# Patient Record
Sex: MALE | Race: WHITE | Employment: OTHER | URBAN - METROPOLITAN AREA
[De-identification: names, ages, dates, MRNs, and addresses within clinical notes are randomized per-mention and may not be internally consistent; named-entity substitution may affect disease eponyms.]

---

## 2024-10-07 ENCOUNTER — TELEPHONE (OUTPATIENT)
Dept: ORTHOPEDICS CLINIC | Facility: CLINIC | Age: 79
End: 2024-10-07

## 2024-10-07 ENCOUNTER — APPOINTMENT (OUTPATIENT)
Dept: GENERAL RADIOLOGY | Age: 79
End: 2024-10-07
Attending: EMERGENCY MEDICINE
Payer: MEDICARE

## 2024-10-07 ENCOUNTER — HOSPITAL ENCOUNTER (OUTPATIENT)
Age: 79
Discharge: HOME | End: 2024-10-07
Attending: EMERGENCY MEDICINE
Payer: MEDICARE

## 2024-10-07 VITALS
TEMPERATURE: 98 F | OXYGEN SATURATION: 100 % | SYSTOLIC BLOOD PRESSURE: 173 MMHG | DIASTOLIC BLOOD PRESSURE: 71 MMHG | RESPIRATION RATE: 18 BRPM | BODY MASS INDEX: 25.76 KG/M2 | HEIGHT: 68 IN | HEART RATE: 65 BPM | WEIGHT: 170 LBS

## 2024-10-07 DIAGNOSIS — M25.561 ACUTE PAIN OF RIGHT KNEE: Primary | ICD-10-CM

## 2024-10-07 PROCEDURE — 73562 X-RAY EXAM OF KNEE 3: CPT | Performed by: EMERGENCY MEDICINE

## 2024-10-07 PROCEDURE — 99203 OFFICE O/P NEW LOW 30 MIN: CPT

## 2024-10-07 RX ORDER — METHYLPREDNISOLONE 4 MG
TABLET, DOSE PACK ORAL
Qty: 1 EACH | Refills: 0 | Status: SHIPPED | OUTPATIENT
Start: 2024-10-07

## 2024-10-07 RX ORDER — TRAZODONE HYDROCHLORIDE 50 MG/1
1 TABLET, FILM COATED ORAL NIGHTLY PRN
COMMUNITY
Start: 2023-06-18

## 2024-10-07 RX ORDER — OLMESARTAN MEDOXOMIL 40 MG/1
40 TABLET ORAL DAILY
COMMUNITY
Start: 2024-09-30 | End: 2025-09-30

## 2024-10-07 NOTE — ED PROVIDER NOTES
Patient Seen in: [facility]      History     Chief Complaint   Patient presents with    Leg or Foot Injury     Stated Complaint: knee pain    Subjective:   HPI  78-year-old male presents to the immediate care for complaints of right knee pain.  He has noticed that for the last several weeks has had intermittent pain to his right knee.  Today noticed that it swelled up.  He denies any trauma or fall.  Denies any distal numbness or tingling.  Denies any calf pain.  Patient has a history of chronic kidney disease.  Has a history of hypertension.    Objective:     No pertinent past medical history.            No pertinent past surgical history.              No pertinent social history.            Review of Systems    Positive for stated complaint: knee pain  Other systems are as noted in HPI.  Constitutional and vital signs reviewed.      All other systems reviewed and negative except as noted above.    Physical Exam     ED Triage Vitals [10/07/24 1102]   BP (!) 173/71   Pulse 65   Resp 18   Temp 97.9 °F (36.6 °C)   Temp src Temporal   SpO2 100 %   O2 Device None (Room air)       Current Vitals:   Vital Signs  BP: (!) 173/71  Pulse: 65  Resp: 18  Temp: 97.9 °F (36.6 °C)  Temp src: Temporal    Oxygen Therapy  SpO2: 100 %  O2 Device: None (Room air)        Physical Exam  General: Alert and oriented. No acute distress.  HEENT: Normocephalic. No evidence of trauma. Extraocular movements are intact.  Cardiovascular exam: Regular rate and rhythm  Lungs: Clear to auscultation bilaterally.  Right knee: No obvious swelling noted.  He has good range of motion to the right knee without significant pain.  Extremities: No evidence of deformity. No clubbing or cyanosis.  Neuro: No focal deficit is noted.    ED Course   Labs Reviewed - No data to display  Differential includes inflammatory arthritis versus degenerative joint disease.  No evidence to suggest an acute infectious etiology.  Clinically not consistent  with a DVT.    Right knee series was ordered for further evaluation    Right knee series is negative for acute fracture.  There is no significant degenerative joint disease.  However he has noted to has a ossified loose foreign body posterior to the knee seen best on the lateral view.    Patient will be discharged home and given follow-up with orthopedics.     MDM   Patient was screened and evaluated during this visit.   As a treating physician attending to the patient, I determined, within reasonable clinical confidence and prior to discharge, that an emergency medical condition was not or was no longer present.  There was no indication for further evaluation, treatment or admission on an emergency basis.  Comprehensive verbal and written discharge and follow-up instructions were provided to help prevent relapse or worsening.  Patient was instructed to follow-up with her primary care provider for further evaluation and treatment, but to return immediately to the ER for worsening, concerning, new, changing or persisting symptoms.  I discussed the case with the patient and they had no questions, complaints, or concerns.  Patient felt comfortable going home.    ^^Please note that this report has been produced using speech recognition software and may contain errors related to that system including, but not limited to, errors in grammar, punctuation, and spelling, as well as words and phrases that possibly may have been recognized inappropriately.  If there are any questions or concerns, contact the dictating provider for clarification      Medical Decision Making      Disposition and Plan     Clinical Impression:  1. Acute pain of right knee         Disposition:  Discharge  10/7/2024 11:56 am    Follow-up:  Scooter Loja PA  [address] [place] [id number]  [phone]    Schedule an appointment as soon as possible for a visit             Medications Prescribed:  Current Discharge Medication List         START taking these medications    Details   methylPREDNISolone (MEDROL) 4 MG Oral Tablet Therapy Pack Dosepack: take as directed  Qty: 1 each, Refills: 0                 Supplementary Documentation:

## 2024-10-07 NOTE — ED INITIAL ASSESSMENT (HPI)
Right knee pain for about 1 week, after shoveling gravel. Pain is getting worse, feels better with a wrap , now today he woke up and his leg is swollen
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
## 2024-10-07 NOTE — DISCHARGE INSTRUCTIONS
Follow up with orthopedic medicine  Continue tylenol or motrin as needed for pain  Take medrol dose pack as directed  Call to make a follow up appointment with othopedics  Return if any worsening symptoms or new concern

## 2024-10-07 NOTE — TELEPHONE ENCOUNTER
Patient is scheduled for right knee. X-rays in Epic. Please advise if additional imaging is needed.  Future Appointments   Date Time Provider Department Center   10/9/2024  7:30 AM [id number]

## 2024-10-09 ENCOUNTER — OFFICE VISIT (OUTPATIENT)
Dept: ORTHOPEDICS CLINIC | Facility: CLINIC | Age: 79
End: 2024-10-09
Payer: COMMERCIAL

## 2024-10-09 VITALS — BODY MASS INDEX: 25.76 KG/M2 | WEIGHT: 170 LBS | HEIGHT: 68 IN

## 2024-10-09 DIAGNOSIS — M17.11 PRIMARY OSTEOARTHRITIS OF RIGHT KNEE: Primary | ICD-10-CM

## 2024-10-09 NOTE — H&P
[facility] - ORTHOPEDICS  [address] [place] [id number]  [phone]     NEW PATIENT VISIT - HISTORY AND PHYSICAL EXAMINATION     Name: Brenden Cortez   MRN: [id number]  Date: 10/9/2024     CC: Right knee pain.     REFERRED BY: No primary care provider on file.    HPI:   Brenden Cortez is a very pleasant 78 year old male who presents today for evaluation, consultation, and management of right knee pain for several weeks without a specific injury mechanism trauma.  He describes some increased swelling and presented to the immediate care in [place] in which she was referred to our service for further evaluation management. He enjoys walking and biking. He rates his pain to be a 5/10.       PMH:   Past Medical History:    Kidney stone    Unspecified essential hypertension       PAST SURGICAL HX:  Past Surgical History:   Procedure Laterality Date    Appendectomy         FAMILY HX:  Family History   Problem Relation Age of Onset    Heart Disease Father     Stroke Mother        ALLERGIES:  Patient has no known allergies.    MEDICATIONS:   Current Outpatient Medications   Medication Sig Dispense Refill    traZODone 50 MG Oral Tab Take 1 tablet (50 mg total) by mouth nightly as needed.      Olmesartan Medoxomil 40 MG Oral Tab Take 1 tablet (40 mg total) by mouth daily.      methylPREDNISolone (MEDROL) 4 MG Oral Tablet Therapy Pack Dosepack: take as directed 1 each 0    Naproxen Sodium (ALEVE) 220 MG Oral Tab Take 1-2 tablets (220-440 mg total) by mouth.      aspirin (BABY ASPIRIN) 81 MG Oral Chew Tab Chew 81 mg by mouth daily. (Patient not taking: Reported on 10/9/2024)      Doxazosin Mesylate (CARDURA) 4 MG Oral Tab Take 1 tablet (4 mg total) by mouth nightly.         ROS: A comprehensive 14 point review of systems was performed and was negative aside from the aforementioned per history of present illness.    SOCIAL HX:  Social History     Occupational History    Not on file    Tobacco Use    Smoking status: Never    Smokeless tobacco: Never   Substance and Sexual Activity    Alcohol use: No    Drug use: No    Sexual activity: Not on file       PE:   Vitals:    10/09/24 0737   Weight: 170 lb (77.1 kg)   Height: 5' 8\" (1.727 m)     Estimated body mass index is 25.85 kg/m² as calculated from the following:    Height as of this encounter: 5' 8\" (1.727 m).    Weight as of this encounter: 170 lb (77.1 kg).    Physical Exam  Constitutional:       Appearance: Normal appearance.   HENT:      Head: Normocephalic and atraumatic.   Eyes:      Extraocular Movements: Extraocular movements intact.   Neck:      Musculoskeletal: Normal range of motion and neck supple.   Cardiovascular:      Pulses: Normal pulses.   Pulmonary:      Effort: Pulmonary effort is normal. No respiratory distress.   Abdominal:      General: There is no distension.   Skin:     General: Skin is warm.      Capillary Refill: Capillary refill takes less than 2 seconds.      Findings: No bruising.   Neurological:      General: No focal deficit present.      Mental Status: Alert.   Psychiatric:         Mood and Affect: Mood normal.     Examination of the right knee demonstrates:     Skin is intact, warm and dry.   Atrophy: none    Effusion: none    Joint line tenderness: none  Crepitation: none   Madina: Negative   Patellar mobility: normal without apprehension  J-sign: none    ROM: Extension full  Flexion 140 degrees  ACL:  Negative Lachman, Negative Pivot Shift   PCL:  Negative Posterior Drawer  Collateral Ligaments: Stable to Varus and Valgus stress at 0 and 30 degrees  Strength: normal   Hip joint: normal pain-free ROM   Gait:  normal   Leg length: equal and symmetric  Alignment:  neutral     No obvious peripheral edema noted.   Distal neurovascular exam demonstrates normal perfusion, intact sensation to light touch and full strength.     Examination of the contralateral knee demonstrates:  No significant atrophy, swelling or  effusion. Full range of motion. Neurovascularly intact distally.    Radiographic Examination/Diagnostics:  I personally viewed, independently interpreted and radiology report was reviewed.      XR KNEE (3 VIEWS), RIGHT (CPT=73562)    Result Date: 10/7/2024  PROCEDURE:  XR KNEE ROUTINE (3 VIEWS), RIGHT (CPT=73562)  TECHNIQUE:  Three views were obtained including patellar view.  COMPARISON:  None.  INDICATIONS:  PATIENT STATED HISTORY: (As transcribed by Technologist)  Right knee pain. No trauma.     FINDINGS:  BONES:  Mild medial compartment narrowing and patellofemoral compartment narrowing right knee.  No acute fracture or dislocation..  No significant arthropathy or acute abnormality.            CONCLUSION:  Mild medial and patellofemoral compartment narrowing right knee.  No significant knee effusion.   LOCATION:  [facility]   Dictated by (CST): Alysha Gunn MD on 10/07/2024 at 12:03 PM     Finalized by (CST): Alysha Gunn MD on 10/07/2024 at 12:04 PM         IMPRESSION: Brenden Cortez is a 78 year old male who presents with right knee osteoarthritis.   PLAN:   We had a detailed discussion outlining the etiology, anatomy, pathophysiology, and natural history of the patient's findings. Imaging was reviewed in detail and correlated to a 3-dimensional model of the patient's pathology.     We reviewed the treatment of this disease condition.      We discussed the etiology, pathophysiology, clinical manifestations and treatment of knee osteoarthritis. We discussed treatment including, but not limited to: weight loss, activity modification, RICE modalities, NSAIDs, steroid injections, viscosupplementation, and surgical intervention.     Fortunately, treatment is amenable to conservative treatment which we chose to optimize at today's visit.  We recommended physical therapy to aid in strengthening, range of motion, functional improvement, and return to baseline activity.  The patient had the opportunity to ask  questions and all questions were answered appropriately.      FOLLOW-UP:  Return to clinic in eight weeks. No imaging required at next visit.             Sincer LISSET Newman, PA-C Orthopedic Surgery / Sports Medicine Specialist  [facility]  [address] [place] [address] [id number] [place] [email or web address]  [email or web address]  t: [phone]  o: [phone]  f: [phone]    This note was dictated using Dragon software.  While it was briefly proofread prior to completion, some grammatical, spelling, and word choice errors due to dictation may still occur.

## 2025-04-08 ENCOUNTER — TELEPHONE (OUTPATIENT)
Dept: ORTHOPEDICS CLINIC | Facility: CLINIC | Age: 80
End: 2025-04-08

## 2025-04-08 DIAGNOSIS — M54.50 LOW BACK PAIN, UNSPECIFIED BACK PAIN LATERALITY, UNSPECIFIED CHRONICITY, UNSPECIFIED WHETHER SCIATICA PRESENT: Primary | ICD-10-CM

## 2025-04-08 NOTE — TELEPHONE ENCOUNTER
XR lumbar ordered and scheduled.     Future Appointments   Date Time Provider Department Center   4/10/2025  8:30 AM WDR XR RM1 WDR XRAY [facility]   4/10/2025  9:00 AM Pb Abrams PA [id number]

## 2025-04-08 NOTE — TELEPHONE ENCOUNTER
Patient is scheduled for low back pain. No xrays in Epic. Please advise if imaging is needed.  Future Appointments   Date Time Provider Department Center   4/10/2025  9:00 AM [id number]

## 2025-04-10 ENCOUNTER — HOSPITAL ENCOUNTER (OUTPATIENT)
Dept: GENERAL RADIOLOGY | Age: 80
Discharge: HOME | End: 2025-04-10
Attending: PHYSICIAN ASSISTANT
Payer: MEDICARE

## 2025-04-10 ENCOUNTER — OFFICE VISIT (OUTPATIENT)
Dept: ORTHOPEDICS CLINIC | Facility: CLINIC | Age: 80
End: 2025-04-10
Payer: COMMERCIAL

## 2025-04-10 VITALS — HEIGHT: 66 IN | BODY MASS INDEX: 27.32 KG/M2 | WEIGHT: 170 LBS

## 2025-04-10 DIAGNOSIS — M47.816 LUMBAR SPONDYLOSIS: ICD-10-CM

## 2025-04-10 DIAGNOSIS — M43.16 SPONDYLOLISTHESIS OF LUMBAR REGION: Primary | ICD-10-CM

## 2025-04-10 DIAGNOSIS — M54.16 LUMBAR RADICULOPATHY: ICD-10-CM

## 2025-04-10 DIAGNOSIS — M54.50 LOW BACK PAIN, UNSPECIFIED BACK PAIN LATERALITY, UNSPECIFIED CHRONICITY, UNSPECIFIED WHETHER SCIATICA PRESENT: ICD-10-CM

## 2025-04-10 PROCEDURE — 99213 OFFICE O/P EST LOW 20 MIN: CPT | Performed by: PHYSICIAN ASSISTANT

## 2025-04-10 PROCEDURE — 72110 X-RAY EXAM L-2 SPINE 4/>VWS: CPT | Performed by: PHYSICIAN ASSISTANT

## 2025-04-10 RX ORDER — PREDNISONE 20 MG/1
20 TABLET ORAL DAILY
Qty: 7 TABLET | Refills: 0 | Status: SHIPPED | OUTPATIENT
Start: 2025-04-10 | End: 2025-04-17

## 2025-04-10 NOTE — PROGRESS NOTES
Patient: Brenden Cortez  Medical Record Number: [id number]  Site: [place]  Referring Physician:  No ref. provider found  PCP: No primary care provider on file.        HISTORY OF CHIEF COMPLAINT:    Brenden Cortez is a 79 year old male, who complains of 2 months h/o left-sided radiating LBP.  Denies saddle anesthesia or incontinence.  That shoots into his left buttock and occasionally down the left leg.  His pain is worse with sleeping, walking, and standing.  He hunches over due to the pain when walking.  He is unable to walk more than 10 minutes due to the pain.  He has weakness in the left leg.  His left leg has given out on him several times and he has fallen because of this weakness.  He has no numbness.  He saw an orthopedic doctor 6 weeks ago.  X-rays of his hips looked good.  He had an MRI ordered of his low back which was denied by insurance.  Since then he has been working with 2 different chiropractors.  He only gets temporary relief with chiropractic care.  He is concerned due to the motor weakness he has.  He is not able to sleep at night because of his pain.    VAS Pain Score: 10 /10        Past Medical History[1]   Past Surgical History[2]   Family History[3]   Social Hx on file[4]   Current Medications:  Current Medications[5]           IMAGING STUDIES:  X-rays were reviewed with the patient today  X-rays  Images were reviewed and discussed with the patient.  They voiced understanding of what the images showed.  XR LUMBAR SPINE (MIN 4 VIEWS) (CPT=72110)  Result Date: 4/10/2025  PROCEDURE:  XR LUMBAR SPINE (MIN 4 VIEWS) (CPT=72110)  TECHNIQUE:  AP, lateral, oblique, and coned down L5-S1 views were obtained.  COMPARISON:  None.  INDICATIONS:  M54.50 Low back pain, unspecified back pain laterality, unspecified chronicity, unspecified whether sciatica present  PATIENT STATED HISTORY: (As transcribed by Technologist)  Ortho consult. Patient states chronic low back pain that radiates down left  side, no known injury.    FINDINGS:    There is dextroconvex curvature of thoracolumbar spine.  Minimal retrolisthesis of T12 on L1 and L1 on L2.  Minimal anterolisthesis of L3 on L4 and L4 on L5..  No acute fractures or osseous lesions are identified.  Multilevel degenerative changes greatest at L5-S1 level.  No increase in subluxation noted on extension or flexion views.            CONCLUSION:  1. No acute fractures. 2. Multilevel degenerative changes.    LOCATION:  [place]   Dictated by (CST): Linda Willson MD on 4/10/2025 at 9:53 AM     Finalized by (CST): Linda Willson MD on 4/10/2025 at 9:54 AM               PHYSICAL EXAMIMATION   PHYSICAL EXAMINATION: Brenden Cortez is a 79 year old   male who is observed sitting comfortably in the exam room alert and oriented times three. RESPIRATORY RATE: 18 He looks consistent his stated age.    Ht 5' 6\" (1.676 m)   Wt 170 lb (77.1 kg)   BMI 27.44 kg/m²   The patient is well developed, well nourished, thin body habitus, well muscled.       Inspection: No acute distress.   Patient displays antalgic gait, and is able to normal heel walk, normal toe walk.       ROM:  Forward Flexion  Pain free    Extension  Pain     Palpation:  See chart below:  Palpation of Lumbar Area Right   (POS or NEG) Left   (POS or NEG)   Lumbar paraspinals Neg Neg   SIJ Neg Neg   PSIS Neg Neg   Trochanteric Bursa Neg Neg     Strength:      DTR's:     Left Right    Left Right    EHL 5/5 5/5  Patella  2+/4 2+/4    DF 5/5 5/5  Achilles  2+/4 2+/4    PF 5/5 5/5    Quad 4/5 5/5    IP 5/5 5/5    Sensation:  Sensory deficits noted on bilateral lower extremities to light touch: none    Tests:  Test Right   (POS or NEG) Left   (POS or NEG)   SLR Neg Neg   Clonus Neg Neg      Calves supple, NT   MUSCULOSKELETAL: Fluid, pain-free ROM to bilateral: ankles, knees  & hips                                                                                     MEDICAL DECISION MAKING:    Impression: Lumbar Spine:  Lumbar Spondylosis 721.3   Lumbar Radiulopathy 724.4   Spondylolisthesis Degenerative 738.4   Left leg weakness  History of a fall  Plan: We discussed the diagnosis and treatment options today, including rationale for surgical vs non-surgical options.  The patient patient is having weakness of his left leg.  His leg has given out on him and he has fallen.  He has had provider directed care for 6 weeks without relief of symptoms.  He has been working with 2 different chiropractors without lasting relief.  He is not getting relief of pain with Tylenol.  Due to the weakness, the fall, and failed conservative treatment I do recommend an MRI of the lumbar spine.  He does have kidney disease but states he is able to tolerate oral steroids.  I gave him prednisone.  Side effects were reviewed and discussed.  He is going to check with his physician to make sure he can take this prior to taking it.  He did have a Medrol Dosepak last year without difficulty.  He is going to follow-up with Dr. Acosta after his MRI.  Restrictions and limitations were reviewed with the patient.  Concerns were addressed.  Questions were encouraged and answered.  Patient voiced understanding.  Images were reviewed and discussed with the patient.  They voiced understanding of what the images showed.        The patient indicates understanding of these issues and agrees to the plan.    Thank you very much.     Respectfully yours,    Pb Abrams PA-C    This note was dictated using Dragon software. While it was briefly proofread prior to completion, some grammatical, spelling, and word choice errors due to dictation may still occur.       [1]   Past Medical History:   Kidney stone    Unspecified essential hypertension   [2]   Past Surgical History:  Procedure Laterality Date    Appendectomy     [3]   Family History  Problem Relation Age of Onset    Heart Disease Father     Stroke Mother    [4]   Social History  Socioeconomic  History    Marital status:    Tobacco Use    Smoking status: Never    Smokeless tobacco: Never   Substance and Sexual Activity    Alcohol use: No    Drug use: No   [5]   Current Outpatient Medications   Medication Sig Dispense Refill    predniSONE 20 MG Oral Tab Take 1 tablet (20 mg total) by mouth daily for 7 days. 7 tablet 0    traZODone 50 MG Oral Tab Take 1 tablet (50 mg total) by mouth nightly as needed.      Olmesartan Medoxomil 40 MG Oral Tab Take 1 tablet (40 mg total) by mouth daily.      methylPREDNISolone (MEDROL) 4 MG Oral Tablet Therapy Pack Dosepack: take as directed 1 each 0    Naproxen Sodium (ALEVE) 220 MG Oral Tab Take 1-2 tablets (220-440 mg total) by mouth.      aspirin (BABY ASPIRIN) 81 MG Oral Chew Tab Chew 81 mg by mouth daily.      Doxazosin Mesylate (CARDURA) 4 MG Oral Tab Take 1 tablet (4 mg total) by mouth nightly.

## 2025-04-14 ENCOUNTER — PATIENT MESSAGE (OUTPATIENT)
Dept: ORTHOPEDICS CLINIC | Facility: CLINIC | Age: 80
End: 2025-04-14

## 2025-04-14 ENCOUNTER — TELEPHONE (OUTPATIENT)
Dept: ORTHOPEDICS CLINIC | Facility: CLINIC | Age: 80
End: 2025-04-14

## 2025-04-14 NOTE — TELEPHONE ENCOUNTER
[facility] and [facility] to schedule f/u appointment w/ Dr. Acosta in [place] for MRI review.   Advised patient to obtain copy of MRI disc to bring to appointment.

## 2025-04-14 NOTE — TELEPHONE ENCOUNTER
Called and spoke w/ patient.   Scheduled patient w/ Dr. Acosta for MRI review at [place]. Appt date/time/location provided to patient. [facility] msg also sent to patient.

## 2025-04-17 ENCOUNTER — TELEPHONE (OUTPATIENT)
Facility: CLINIC | Age: 80
End: 2025-04-17

## 2025-04-17 DIAGNOSIS — M47.816 LUMBAR SPONDYLOSIS: ICD-10-CM

## 2025-04-17 DIAGNOSIS — M54.16 LUMBAR RADICULOPATHY: ICD-10-CM

## 2025-04-17 DIAGNOSIS — M43.16 SPONDYLOLISTHESIS OF LUMBAR REGION: Primary | ICD-10-CM

## 2025-04-17 NOTE — TELEPHONE ENCOUNTER
Called and spoke w/ patient.   Scheduled patient for sooner appointment w/ Dr. Acosta for MRI review. Appt date/time/location provided.   Patient scheduled for appt w/ pain specialist PAUL to discuss possible injection per patient's request. Appt date/time/location provided to patient.   Patient will bring MRI lumbar disc to both appointments.   Answered patient's questions.     Your appointments       Date & Time Appointment Department ([place])    Apr 22, 2025 8:00 AM CDT Exam - New Patient with Sedrick Delgado PA [facility] ([facility])              [facility]  [address] [place] [id number] [phone]

## 2025-04-17 NOTE — TELEPHONE ENCOUNTER
Patient needs a referral for the pain clinic. He would like a call back to know when this is done.    Please advise.

## 2025-04-22 ENCOUNTER — OFFICE VISIT (OUTPATIENT)
Dept: PAIN CLINIC | Facility: CLINIC | Age: 80
End: 2025-04-22
Payer: COMMERCIAL

## 2025-04-22 VITALS
WEIGHT: 170 LBS | SYSTOLIC BLOOD PRESSURE: 138 MMHG | HEART RATE: 50 BPM | BODY MASS INDEX: 27 KG/M2 | DIASTOLIC BLOOD PRESSURE: 70 MMHG | OXYGEN SATURATION: 98 %

## 2025-04-22 DIAGNOSIS — M48.062 LUMBAR STENOSIS WITH NEUROGENIC CLAUDICATION: Primary | ICD-10-CM

## 2025-04-22 PROCEDURE — 99214 OFFICE O/P EST MOD 30 MIN: CPT | Performed by: PHYSICIAN ASSISTANT

## 2025-04-22 NOTE — PATIENT INSTRUCTIONS
Refill policies:    Allow 2-3 business days for refills; controlled substances may take longer.  Contact your pharmacy at least 5 days prior to running out of medication and have them send an electronic request or submit request through the “request refill” option in your [email or web address] account.  Refills are not addressed on weekends; covering physicians do not authorize routine medications on weekends.  No narcotics or controlled substances are refilled after noon on Fridays or by on call physicians.  By law, narcotics must be electronically prescribed.  A 30 day supply with no refills is the maximum allowed.  If your prescription is due for a refill, you may be due for a follow up appointment.  To best provide you care, patients receiving routine medications need to be seen at least once a year.  Patients receiving narcotic/controlled substance medications need to be seen at least once every 3 months.  In the event that your preferred pharmacy does not have the requested medication in stock (e.g. Backordered), it is your responsibility to find another pharmacy that has the requested medication available.  We will gladly send a new prescription to that pharmacy at your request.    Scheduling Tests:    If your physician has ordered radiology tests such as MRI or CT scans, please contact Central Scheduling at [phone] right away to schedule the test.  Once scheduled, the [facility] Centralized Referral Team will work with your insurance carrier to obtain pre-certification or prior authorization.  Depending on your insurance carrier, approval may take 3-10 days.  It is highly recommended patients assure they have received an authorization before having a test performed.  If test is done without insurance authorization, patient may be responsible for the entire amount billed.      Precertification and Prior Authorizations:  If your physician has recommended that you have a procedure or additional testing performed the [facility]  Centralized Referral Team will contact your insurance carrier to obtain pre-certification or prior authorization.    You are strongly encouraged to contact your insurance carrier to verify that your procedure/test has been approved and is a COVERED benefit.  Although the [facility] Centralized Referral Team does its due diligence, the insurance carrier gives the disclaimer that \"Although the procedure is authorized, this does not guarantee payment.\"    Ultimately the patient is responsible for payment.   Thank you for your understanding in this matter.  Paperwork Completion:  If you require FMLA or disability paperwork for your recovery, please make sure to either drop it off or have it faxed to our office at [phone]. Be sure the form has your name and date of birth on it.  The form will be faxed to our Forms Department and they will complete it for you.  There is a 25$ fee for all forms that need to be filled out.  Please be aware there is a 10-14 day turnaround time.  You will need to sign a release of information (CITLALLI) form if your paperwork does not come with one.  You may call the Forms Department with any questions at [phone].  Their fax number is [phone].

## 2025-04-22 NOTE — PROGRESS NOTES
Subjective:   Patient ID: Brenden Cortez is a 79 year old male.    HPI    History/Other:   Review of Systems  Current Medications[1]  Allergies:Allergies[2]    Objective:   Physical Exam  Constitutional:          Assessment & Plan:   No diagnosis found.    No orders of the defined types were placed in this encounter.      Meds This Visit:  Requested Prescriptions      No prescriptions requested or ordered in this encounter       Imaging & Referrals:  None    Location of Pain: bilateral low back and glute radiating into left upper leg    Date Pain Began: Feb 2025          Work Related:   No        Receiving Work Comp/Disability:   No    Numeric Rating Scale:  Pain at Present:  7/10                                                                                                            (No Pain) 0  to  10 (Worst Pain)                 Minimum Pain:   7  Maximum Pain  7    Distribution of Pain:    bilateral    Quality of Pain:   sharp/stabbing and shooting    Origin of Pain:    Degenerative    Aggravating Factors:    Sitting    Past Treatments for Current Pain Condition:   NSAIDS and Other prednisone    Prior diagnostic testing for your pain:  xray, MRI            [1]  Current Outpatient Medications   Medication Sig Dispense Refill   • traZODone 50 MG Oral Tab Take 1 tablet (50 mg total) by mouth nightly as needed.     • Olmesartan Medoxomil 40 MG Oral Tab Take 1 tablet (40 mg total) by mouth daily.     • methylPREDNISolone (MEDROL) 4 MG Oral Tablet Therapy Pack Dosepack: take as directed 1 each 0   • Naproxen Sodium (ALEVE) 220 MG Oral Tab Take 1-2 tablets (220-440 mg total) by mouth.     • aspirin (BABY ASPIRIN) 81 MG Oral Chew Tab Chew 81 mg by mouth daily.     • Doxazosin Mesylate (CARDURA) 4 MG Oral Tab Take 1 tablet (4 mg total) by mouth nightly.     [2]  No Known Allergies

## 2025-04-22 NOTE — PROGRESS NOTES
Patient: Brenden Cortez  Medical Record Number: [id number]  Site: [facility]  Referring Physician:  Dr. Acosta  PCP:     Dear Dr. Acosta:    Thank you very much for requesting this consultation. I had the opportunity to evaluate and initiate care for your patient today, as per your request.    HISTORY OF CHIEF COMPLAINT:      rBenden Cortez is a 79 year old male, who complains of low back pain with radiating left thigh pain.    Patient is here today at the request of spine surgery with pain in above-described distribution that began atraumatically 1/2025.  He has tried chiropractor, to limited relief.  He has been active with HEP, to brief improvement, and is scheduled for PT in the coming weeks.  With regards to medications, he has tried course of tapered p.o. steroid to limited relief.  After imaging did reveal L4/5 spondylolisthesis and resultant stenosis, evaluated by spine surgery.  Sent for TRAY prior to consideration of more aggressive options.      VAS Pain Score:  7/10    Aggravating Factors: Relieving Factors:   Lying in one position for prolonged time Changing positions      Past Treatment Attempted/Patient’s Response:  As above     Past Medical History[1]   Past Surgical History[2]   Family History[3]   Social Hx on file[4]   Current Medications:  Current Medications[5]     Functional Assessment: Patient reports that they are able to complete all of their ADL's such as eating, bathing, using the toilet, dressing and getting up from a bed or a chair independently.    Work History:  The patient currently is retired.    REVIEW OF SYSTEMS:   10 point review of systems is otherwise negative,unless otherwise in HPI.      Radiology/Lab Test Reviewed:  MRI L spine (see PACS):        CBC:  No results found for: \"WBC\"No results found for: \"HEMOGLOBIN\"No results found for: \"PLT\"      PHYSICAL EXAMIMATION   PHYSICAL EXAMINATION: Brenden Cortez is a 79 year old male who is observed  sitting comfortably on a chair in the exam room alert and oriented times three. He looks consistent with his stated age.    Ht Readings from Last 1 Encounters:   04/10/25 66\"     Wt Readings from Last 1 Encounters:   04/22/25 170 lb (77.1 kg)     The patient is well developed, well nourished, normal body habitus, well muscled. He moves independently from sitting to standing with ease.       Inspection:  No acute distress    Patient displays Antalgic gait, and is able to Normal heel walk, Normal toe walk.    Coordination:  Well-coordinated, fluent gait, able to engage in rapid alternating movements of upper and lower extremities.    ROM Lumbar Spine:  See chart below:  Motion Right (+ or -) Left (+ or -)   Lumbar Flexion - -   Lumbar Extension - +   Lumbar Bending - -   Lumbar Extension/ twisting motion - -     Lumbar/Sacral Integument:  Skin over lumbar sacral spine is intact without rashes, excoriations, lesions or erythema noted    Palpation:  See chart below:  Palpation of lumbar area Right (+ or -) Left (+ or -)   Lumbar facets - -   Lumbar paraspinals - -   Piriformis - -   SIJ - -   Trochanteric Bursa - -     Strength:  Strength of bilateral lower extremities grossly intact; 5/5 throughout    Sensation:  No sensory deficits noted on bilateral lower extremities to light touch    Tests:  Test Right (+ or -) Left (+ or -)   SLR - -   Pino’s - -   Babinski     Clonus       HEAD/NECK: Head is normocephalic, neck supple  EYES: EOMI, HERMILO  SKIN EXAM: Skin is intact, head, neck, trunk and arms/legs. No rashes, mottling or ulcerations.  LYMPH EXAM: There is no lymph edema in either lower extremity.  VASCULAR EXAM: Pedal pulses are normal bilaterally, with good distal perfusion. No clubbing or cyanosis.  ABDOMINAL EXAM: Abdomen is soft without masses palpated.  HEART: normal, regular, S1 and S2  LUNGS:CTA  MUSCULOSKELETAL: Smooth, pain-free ROM to bilat hips, ankles, and knees.     Do you have any known  blood/bleeding disorders?  No  Does patient currently take blood thinners?   None  Does patient currently take any antibiotics?   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  Symptoms progressively present over the past 4 months, and has failed time, activity modification, chiropractic care, and home exercise program.  Has been evaluated by spine surgery, and prior to consideration of more aggressive options was asked to consider LESI.    On exam does have pain with range of motion lumbar spine.  No focal sensory/motor deficits.  Negative straight leg raise.    Will proceed with LESI, risks and benefits of which were reviewed in detail.  Follow-up 2 to 3 weeks.    Plan: L5-S1 TRAY.  Follow-up 2 to 3 weeks.    The patient indicates understanding of these issues and agrees to the plan.      Thank you very much.     Respectfully yours,    WILLIAM Lopes         [1]   Past Medical History:   Kidney stone    Unspecified essential hypertension   [2]   Past Surgical History:  Procedure Laterality Date    Appendectomy     [3]   Family History  Problem Relation Age of Onset    Heart Disease Father     Stroke Mother    [4]   Social History  Socioeconomic History    Marital status:    Tobacco Use    Smoking status: Never    Smokeless tobacco: Never   Substance and Sexual Activity    Alcohol use: No    Drug use: No   [5]   Current Outpatient Medications   Medication Sig Dispense Refill    traZODone 50 MG Oral Tab Take 1 tablet (50 mg total)  by mouth nightly as needed.      Olmesartan Medoxomil 40 MG Oral Tab Take 1 tablet (40 mg total) by mouth daily.      Doxazosin Mesylate (CARDURA) 4 MG Oral Tab Take 1 tablet (4 mg total) by mouth nightly.

## 2025-04-23 ENCOUNTER — TELEPHONE (OUTPATIENT)
Dept: PAIN CLINIC | Facility: CLINIC | Age: 80
End: 2025-04-23

## 2025-04-23 DIAGNOSIS — M54.16 LUMBAR RADICULITIS: Primary | ICD-10-CM

## 2025-04-23 NOTE — TELEPHONE ENCOUNTER
Mary Lousteven's wife Yola called stating that she spoke with patient's insurance who stated that they are needing the billing code in order to approve the injections. Yola would like a call back.

## 2025-04-23 NOTE — TELEPHONE ENCOUNTER
Patient states he needs to get his procedure scheduled immediately. Patient states he contacted insurance and there is no prior authorization needed.

## 2025-04-24 NOTE — TELEPHONE ENCOUNTER
Prior authorization request completed for: LESOLYA   Authorization # no auth needed   Pre-D: no  Exclusions/Restrictions: no  Covered Benefit: yes   Authorization dates: n/a  CPT codes approved: n/a  Number of visits/dates of service approved: 1  Physician: rigo  Location: [facility]   Call Ref#: [id number]  Representative Name: [facility]  Insurance Carrier: [phone]    Patient can be scheduled. Routed to Navigator.

## 2025-04-24 NOTE — TELEPHONE ENCOUNTER
Patient advised of insurance approval to proceed with injections and is agreeable to scheduling. pre-procedure instructions reviewed. Patient prefers Local sedation. Reviewed sedation instructions including No Fasting & No  Required. Patient encouraged but not required to hold NSAIDs for 24 hours prior to procedure. Patient verbalized understanding of instructions, no further needs at this time.     Patient asked if this could be done Tuesday (meaning 04/29/25)? -- informed patient that  Lab schedule is fully booked, but offered patient next opening which is that same week on Thursday 05/01/25.     -- patient asked if there is any way schedule can be patient switched or provider can stay later on Tuesday? Informed patient that unfortunately cannot have provider stay later in the lab as there is set hours he does procedures and cannot switch patient cases to another day. Explained to patient that if there is a cancellation for Tuesday 04/29, will contact him to offer date. Patient VU.       [facility]  PRE-PROCEDURE INSTRUCTIONS WITHOUT SEDATION    Procedure: LESI       Appointment Date: 05/01/2025  Check-In Time: 01:15 PM      Prior to the procedure:  Please update us prior to the procedure if you are experiencing any symptoms of infection such as cough, fever, chills, urinary symptoms, or have recently been prescribed antibiotics, have open wounds, have recently had surgery or dental procedures.    Day of Procedure:  **Drivers will be required for patients who receive prescriptions for Valium.    NO FASTING REQUIRED  Please bring your Insurance Card, Photo ID, List of Current Medications and Referral (if applicable) to your appointment.  Please park in the [facility] [employer] [facility] and follow the signs to the [facility] [place].  Check in at [facility] ([address] [place] [address]) outpatient registration in the [facility] [place].  Please note-No prescriptions will be written by Pain  Clinic in [place] on the day of procedure. If you require a refill of medications, please contact the office 48 hours prior to your procedure.  If you have an implanted Spinal Cord or Peripheral Nerve Stimulator: Please remember to turn device off for procedure.        Medication Hold:    Number of days you need to be off for the following medications:    Aggrenox 10 days   Agrylin (Anagrelide) 10 days  Brilinta (Ticagrelor) 7 days  Imbruvica (Ibrutinib) 3 days   Enbrel (Etanercept) 24 hours   Fragmin (Dalteparin) 24 hours   Pletal (Cilostazol) 7 days  Effient (Prasugrel) 7 days  Pradaxa 10 days  Trental 7 days  Eliquis (Apixaban) 3 days  Xarelto (Rivaroxaban) 3 days  Lovenox (Enoxaparin) 24 hours  Aspirin  Greater than 81mg but less than 325mg   5 days  325mg and greater                  7 days  Coumadin       5 days  Procedure may be cancelled if INR is elevated.   Excedrin (with aspirin) 7 days  Plavix (Clopidogrel)                            7 days    NSAIDs: 24 hours preferred      Ibuprofen (Motrin, Advil, Vicoprofen), Naproxen (Naprosyn, Aleve), Piroxcam (Feldene), Meloxicam (Mobic), Oxaprozin (Daypro), Diclofenac (Voltaren), Indomethacin (Indocin), Etodolac (Lodine), Nabumetone (Relafen), Celebrex (Celecoxib)           HERBAL SUPPLEMENTS  5 days preferred  Fish oil, krill oil, Omega-3, Vascepa, Vitamin E, Turmeric, Garlic                       Insurance Authorization:   Most insurances are now requiring a preauthorization for all procedures.  In the event that your insurance does not authorize your procedure within 48 hours of the scheduled date, your procedure will be cancelled and rescheduled to a later date.  Please contact your insurance carrier to determine what your financial responsibility will be for the procedure(s).      Cancellation/Rescheduling Appointment:   In the event you need to cancel or reschedule your appointment, you must notify the office 24 hours prior.    Post-procedure instructions:         Please schedule a follow up visit within 2 to 4 weeks after your last procedure date   Please call our office with any questions or concerns before or after your procedure at  [phone].  If you are a diabetic, please increase the frequency of your glucose monitoring after the procedure as this may cause a temporary increase in your blood sugar.  Contact your primary care physician if your blood sugar rises as you may require some medication adjustment.  It is normal to have increased pain at injection site for up to 3-5 days after procedure, you can use heat or ice (20 minutes on 20 minutes off) for comfort.    **To hear a recorded version of these instructions, please call [phone] and follow the prompts.  **Para escuchar las instrucciones en Español, por favor de llamar el faby [phone] opción 4.

## 2025-05-01 ENCOUNTER — APPOINTMENT (OUTPATIENT)
Dept: GENERAL RADIOLOGY | Facility: HOSPITAL | Age: 80
End: 2025-05-01
Attending: ANESTHESIOLOGY
Payer: MEDICARE

## 2025-05-01 ENCOUNTER — HOSPITAL ENCOUNTER (OUTPATIENT)
Facility: HOSPITAL | Age: 80
Setting detail: HOSPITAL OUTPATIENT SURGERY
Discharge: HOME | End: 2025-05-01
Attending: ANESTHESIOLOGY | Admitting: ANESTHESIOLOGY
Payer: MEDICARE

## 2025-05-01 VITALS
SYSTOLIC BLOOD PRESSURE: 152 MMHG | WEIGHT: 170 LBS | HEART RATE: 67 BPM | BODY MASS INDEX: 27.32 KG/M2 | DIASTOLIC BLOOD PRESSURE: 73 MMHG | HEIGHT: 66 IN | RESPIRATION RATE: 18 BRPM | TEMPERATURE: 98 F | OXYGEN SATURATION: 98 %

## 2025-05-01 PROBLEM — M54.16 LUMBAR RADICULITIS: Status: ACTIVE | Noted: 2025-05-01

## 2025-05-01 PROCEDURE — 62323 NJX INTERLAMINAR LMBR/SAC: CPT | Performed by: ANESTHESIOLOGY

## 2025-05-01 RX ORDER — LIDOCAINE HYDROCHLORIDE 10 MG/ML
INJECTION, SOLUTION EPIDURAL; INFILTRATION; INTRACAUDAL; PERINEURAL
Status: DISCONTINUED | OUTPATIENT
Start: 2025-05-01 | End: 2025-05-01

## 2025-05-01 RX ORDER — DEXAMETHASONE SODIUM PHOSPHATE 10 MG/ML
INJECTION, SOLUTION INTRAMUSCULAR; INTRAVENOUS
Status: DISCONTINUED | OUTPATIENT
Start: 2025-05-01 | End: 2025-05-01

## 2025-05-01 RX ORDER — SODIUM CHLORIDE 9 MG/ML
INJECTION, SOLUTION INTRAMUSCULAR; INTRAVENOUS; SUBCUTANEOUS
Status: DISCONTINUED | OUTPATIENT
Start: 2025-05-01 | End: 2025-05-01

## 2025-05-01 RX ORDER — NALOXONE HYDROCHLORIDE 0.4 MG/ML
0.08 INJECTION, SOLUTION INTRAMUSCULAR; INTRAVENOUS; SUBCUTANEOUS AS NEEDED
Status: DISCONTINUED | OUTPATIENT
Start: 2025-05-01 | End: 2025-05-01

## 2025-05-01 RX ORDER — TAMSULOSIN HYDROCHLORIDE 0.4 MG/1
CAPSULE ORAL
COMMUNITY
Start: 2025-04-26

## 2025-05-01 RX ORDER — IOPAMIDOL 408 MG/ML
INJECTION, SOLUTION INTRATHECAL
Status: DISCONTINUED | OUTPATIENT
Start: 2025-05-01 | End: 2025-05-01

## 2025-05-01 NOTE — OPERATIVE REPORT
[facility]  Operative Report  2025     Brenden Cortez Patient Status:  Hospital Outpatient Surgery    1945 MRN [id number]   Location [facility] PAIN [facility] Attending Dav Lara MD   Hosp Day # 0 PCP None Pcp     Indication: Brenden is a 79 year old male with spinal stenosis and radiculitis    Preoperative Diagnosis:  Lumbar radiculitis [M54.16]    Postoperative Diagnosis: Same as above.    Procedure performed: LUMBAR INTERLAMINAR EPIDURAL INJECTION with local    Anesthesia: Local      EBL: Less than 1 ml.    Procedure Description:  After reviewing the patient's history and performing a focused physical examination, the diagnosis and positive previous diagnostic tests were confirmed and contraindications such as infection and coagulopathy were ruled out.  Following review of allergies and potential side effects and complications, including but not necessarily limited to infection, allergic reaction, local tissue breakdown, nerve injury, post-dural puncture headache and paresis, the patient consented for the procedure.    The patient was brought to the procedure room in prone position.  After sterile prep of the lumbar spine, the L5-S1 interspace was identified with the help of fluoroscopy. Local anesthetic was given by a 25 gauge 1.5 inch needle with 1% lidocaine in that space level.  Thereafter, a 20 gauge Tuohy needle was introduced and advanced under fluoroscopy.  The epidural space was accessed by using loss of resistance to air technique.  The needle position was confirmed with AP and lateral view under fluoroscopy.  Omnipaque 180 was injected in 1 mL volume. A good epidurogram was obtained.  Thereafter, dexamethasone 10 mg with normal saline of 5 mL in total volume of 6 mL was injected through the Tuohy needle.  The needle was flushed with 1 mL lidocaine.  The needle was withdrawn with the stylet intact in situ.  The needle's tip was intact.  The patient tolerated the  procedure very well without significant immediate complication.  The patient's back was cleaned and sterile dressing was applied.  The patient was discharged with an instruction to a responsible adult after discharge criteria were met.  The patient was advised to contact us should any problems happen.  The patient was also informed to go to the emergency room immediately if experiencing severe numbness or weakness in the extremities or experiencing bowel or bladder incontinence.            Complications: None.    Follow up: The patient was followed in the pain clinic as needed basis.          Dav Lara MD

## 2025-05-01 NOTE — DISCHARGE INSTRUCTIONS
Home Care Instructions Following Your Pain Procedure     Brenden,  It has been a pleasure to have you as our patient. To help you at home, you must follow these general discharge instructions. We will review these with you before you are discharged. It is our hope that you have a complete and uneventful recovery from our procedure.     General Instructions:  What to Expect:  Bandages from your procedure today can be removed when you get home.  Please avoid soaking and/or swimming for 24 hours.  Showering is okay  It is normal to have increased pain symptoms and/or pain at injection site for up to 3-5 days after procedure, you can use heat or ice (20 minutes on 20 minutes off) for comfort.  You may experience some temporary side effects which may include restlessness or insomnia, flushing of the face, or heart palpitations.  Please contact the provider if these symptoms do not resolve within 3-4 days.  Lightheadedness or nausea may occur and should resolve within 24 to 48 hours.  If you develop a headache after treatment, rest, drink fluids (with caffeine, if possible) and take mild over-the-counter pain medication.  If the headache does not improve with the above treatment, contact the physician.  Home Medications:  Resume all previously prescribed medication.  Please avoid taking NSAIDs (Non-Steriodal Anti-Inflammatory Drugs) such as:  Ibuprofen ( Advil, Motrin) Aleve (Naproxen), Diclofenac, Meloxicam for 6 hours after procedure.   If you are on Coumadin (Warfarin) or any other anti-coagulant (or \"blood thinning\") medication such as Plavix (Clopidogrel), Xarelto (Rivaroxaban), Eliquis (Apixaban), Effient (Prasugrel) etc., restart on the following day from the procedure unless otherwise directed by your provider.  If you are a diabetic, please increase the frequency of your glucose monitoring after the procedure as steroids may cause a temporary (2-3 day) increase in your blood sugar.  Contact your primary care  physician if your blood sugar remains elevated as you may require some medication adjustment.  Diet:  Resume your regular diet as tolerated.  Activity:  We recommend that you relax and rest during the rest of your procedure day.  If you feel weakness in your arms or legs do not drive.  Follow-up Appointment  Please schedule a follow-up visit within 3 to 4 weeks after your last procedure date.  Question or Concerns:  Feel free to call our office with any questions or concerns at [phone] (option #2)    Brenden  Thank you for coming to [facility] for your procedure.  The nurses try very hard to make sure you receive the best care possible.  Your trust in them as well as us is greatly appreciated.    Thanks so much,   Dr. Dav Lara

## 2025-05-01 NOTE — H&P
History & Physical Examination    Patient Name: Brenden Cortez  MRN: [id number]  [facility]: [id number]  YOB: 1945    Pre-Operative Diagnosis:  Lumbar radiculitis [M54.16]    Present Illness: Lumbar radiculitis    ASA: 3  MP class: 1  Sedation: Local      Prescriptions Prior to Admission[1]  Current Hospital Medications[2]    Allergies: Allergies[3]    Past Medical History[4]  Past Surgical History[5]  Family History[6]  Social History     Tobacco Use    Smoking status: Never    Smokeless tobacco: Never   Substance Use Topics    Alcohol use: No       SYSTEM Check if Review is Normal Check if Physical Exam is Normal If not normal, please explain:   HEENT [x ] [x ]    NECK & BACK [x ] [x ]    HEART [x ] [x ]    LUNGS [x ] [x ]    ABDOMEN [x ] [x ]    UROGENITAL [x ] [x ]    EXTREMITIES [x ] [x ]    OTHER        [ x ] I have discussed the risks and benefits and alternatives with the patient/family.  They understand and agree to proceed with plan of care.  [ x ] I have reviewed the History and Physical done within the last 30 days.  Any changes noted above.    Dav Lara MD              [1]   Medications Prior to Admission   Medication Sig Dispense Refill Last Dose/Taking    tamsulosin 0.4 MG Oral Cap TAKE ONE CAPSULE BY MOUTH ONE TIME EACH DAY WITH BREAKFAST. CAPSULES SHOULD BE TAKEN 30 MINUTES FOLLOWING THE SAME MEAL EACH DAY.   Taking    [] predniSONE 20 MG Oral Tab Take 1 tablet (20 mg total) by mouth daily for 7 days. 7 tablet 0     traZODone 50 MG Oral Tab Take 1 tablet (50 mg total) by mouth nightly as needed.       Olmesartan Medoxomil 40 MG Oral Tab Take 1 tablet (40 mg total) by mouth daily.       Doxazosin Mesylate (CARDURA) 4 MG Oral Tab Take 1 tablet (4 mg total) by mouth nightly.   Unknown   [2]   No current facility-administered medications for this encounter.   [3] No Known Allergies  [4]   Past Medical History:   Kidney stone    Unspecified essential hypertension   [5]   Past  Surgical History:  Procedure Laterality Date    Appendectomy     [6]   Family History  Problem Relation Age of Onset    Heart Disease Father     Stroke Mother

## 2025-05-02 ENCOUNTER — TELEPHONE (OUTPATIENT)
Dept: PAIN CLINIC | Facility: CLINIC | Age: 80
End: 2025-05-02

## 2025-05-02 NOTE — TELEPHONE ENCOUNTER
Ham called placed to patient for post procedure follow up. Patient stated last night he was feeling good but this morning his legs were bothering him. Informed patient that soreness is to be expected after the procedure. Educated patient that it takes 3-5 days for the steroid to be effective and to allow adequate time for medication to work. Encouraged patient to alternate ice and heat and to take medications as prescribed. Pt verbalized understanding to call with any questions or concerns.      Procedure: VALDO   Date: 5/1/25  Follow up Visit Scheduled: None (will call back)

## 2025-08-11 ENCOUNTER — TELEPHONE (OUTPATIENT)
Facility: CLINIC | Age: 80
End: 2025-08-11

## (undated) DEVICE — GLOVE SUR 6.5 SENSICARE PIP WHT PWD F

## (undated) DEVICE — BANDAGE ADH 1INX3IN NAT FAB N ADH PD CURAD

## (undated) DEVICE — AVANOS* TUOHY EPIDURAL NEEDLE: Brand: AVANOS

## (undated) DEVICE — GLOVE SUR 7.5 SENSICARE PIP WHT PWD F

## (undated) DEVICE — PAIN TRAY: Brand: MEDLINE INDUSTRIES, INC.

## (undated) DEVICE — SKIN REG/FINE DUAL MARKER, RULER, LABELS: Brand: MEDLINE

## (undated) DEVICE — REMOVER LOT 4OZ N IRRIG UNSCNT SFT MOIST LIQ

## (undated) DEVICE — SYRINGE 10ML SLIP TIP LOSS OF RESIST PLAS